# Patient Record
Sex: FEMALE | Race: WHITE | Employment: OTHER | URBAN - METROPOLITAN AREA
[De-identification: names, ages, dates, MRNs, and addresses within clinical notes are randomized per-mention and may not be internally consistent; named-entity substitution may affect disease eponyms.]

---

## 2018-01-15 NOTE — PROGRESS NOTES
Ambulatory/Rehab Services H2 Model Falls Risk Assessment    Risk Factor Pts. ·   Confusion/Disorientation/Impulsivity  []    4 ·   Symptomatic Depression  []   2 ·   Altered Elimination  []   1 ·   Dizziness/Vertigo  []   1 ·   Gender (Male)  []   1 ·   Any administered antiepileptics (anticonvulsants):  []   2 ·   Any administered benzodiazepines:  []   1 ·   Visual Impairment (specify):  []   1 ·   Portable Oxygen Use  []   1 ·   Orthostatic ? BP  []   1 ·   History of Recent Falls (within 3 mos.)  []   5     Ability to Rise from Chair (choose one) Pts. ·   Ability to rise in a single movement  []   0 ·   Pushes up, successful in one attempt  []   1 ·   Multiple attempts, but successful  []   3 ·   Unable to rise without assistance  [x]   4   Total: (5 or greater = High Risk) 4     Falls Prevention Plan:   []                Physical Limitations to Exercise (specify):   []                Mobility Assistance Device (type):   []                Exercise/Equipment Adaptation (specify):    ©2010 [facility] [address] [place] [address] Patent #[id number].  Federal Law prohibits the replication, distribution or use without written permission from [facility] [employer]

## 2018-01-15 NOTE — THERAPY EVALUATION
Marlon Do  : 1935 [id number] [facility],[address] [place] [address].  [phone]   [phone]        OUTPATIENT PHYSICAL THERAPY:Initial Assessment 2018        ICD-10: Treatment Diagnosis: Muscle weakness (generalized) (M62.81)Other abnormalities of gait and mobility (R26.89)  Precautions/Allergies:   Review of patient's allergies indicates no known allergies. Fall Risk Score: 4 (? 5 = High Risk)  MD Orders: Evaluation and treatment  MEDICAL/REFERRING DIAGNOSIS:  Weakness [R53.1]  Ataxic gait [R26.0]   DATE OF ONSET:   REFERRING PHYSICIAN: Stanford Rodríguez MD  RETURN PHYSICIAN APPOINTMENT: TBD     INITIAL ASSESSMENT:  Ms. Gino Dodge presents with decreased strength, decreased functional mobility, poor balance, and difficulty with gait. Her symptoms are consistent with chronic debility. She reports being w/c bound for 6 years and using a w/c for her primary mode of ambulation. She requires paid home health aide to transport her to PT because she is unable to drive and it takes her a taxing effort to leave the home. She has a poor prognosis with PT services. Recommend PT services to improve function and decrease pain. PROBLEM LIST (Impacting functional limitations):  1. Decreased Strength  2. Decreased ADL/Functional Activities  3. Decreased Transfer Abilities  4. Decreased Ambulation Ability/Technique  5. Decreased Balance  6. Decreased Flexibility/Joint Mobility INTERVENTIONS PLANNED:  1. Balance Exercise  2. Cold  3. Continuous Passive Motion (CPM)  4. Electrical Stimulation  5. Gait Training  6. Heat  7. Home Exercise Program (HEP)  8. Manual Therapy  9. Range of Motion (ROM)  10. Therapeutic Activites  11. Therapeutic Exercise/Strengthening  12. Transfer Training   TREATMENT PLAN:  Effective Dates: 18 TO 4/10/18.   Frequency/Duration: 2-3 times a week for 12 weeks  GOALS: (Goals have been discussed and agreed upon with patient.)  Short-Term Functional Goals: Time Frame: 2 weeks  1. Patient will be independent with HEP without pain. 2. Patient will have improved standing tolerance to > 10 minutes without UE support allowing her to cook a light meal at home  Discharge Goals: Time Frame: 12 weeks  1. Patient will have improved TUG to < 60 seconds allowing her to have house hold ambulation. 2. Patient will have improved gait to SBA x 50 ft with FWW allowing her to walk in her home. 3. Patient will have improved Five time sit to stand to < 60 seconds without UE support improving her functional strength. Rehabilitation Potential For Stated Goals: Poor  Regarding Jodeju[facility] Milly therapy, I certify that the treatment plan above will be carried out by a therapist or under their direction. Thank you for this referral,  Para Christopher, PT       Referring Physician Signature: Darrel Sanches MD              Date                      HISTORY:   History of Present Injury/Illness (Reason for Referral):  81 y/o F with c/o of left leg weakness since she had colon cancer and the treatment caused her left leg weakness (since 2011). Her weakness is becoming worse over the years. She reports that she only can stand for 1-2 minutes. She denies pain. Her goals with PT are to be able to strengthen her left leg so she can stand longer. She reports that she has not walked household distance in over 6 years since her cancer treatment. She is currently able to take 4 steps with a walker and that is her baseline function for 6 years. Past Medical History/Comorbidities:   Ms. Alvaro Stephen  has a past medical history of Arthritis; Chronic Sinusitis; Enzyme Deficiency; Hypertension; Leg Cramps; and Pseudocholinesterase Deficiency. She also has no past medical history of Arrhythmia; Asthma; Autoimmune Disease ([address] CAD (Coronary Artery Disease); Cancer ([address]  Chronic Kidney Disease; Chronic Pain; Coagulation Defects; COPD; Diabetes ([address] GERD (Gastroesophageal Reflux Disease); Heart Failure ([facility] Liver Disease; PUD (Peptic Ulcer Disease); Seizures ([facility] Stroke [facility]); Thromboembolus ([facility] Thyroid Disease; or Unspecified Adverse Effect of Anesthesia. Ms. Jennifer Zapien  has a past surgical history that includes hx back surgery ([id number]). Social History/Living Environment:    Lives alone with care givers, w/c bound for 6 years for community and house hold mobility. Prior Level of Function/Work/Activity:  It has been 6 years since she was ambulatory  Previous Treatment Approaches:          [facility] PT recently   Current Medications:    Current Outpatient Prescriptions:     OTHER,NON-FORMULARY,, Pt states taking antibiotic for sinus infection, unsure of name. , Disp: , Rfl:     mupirocin calcium (BACTROBAN NASAL) 2 % nasal ointment, by Nasal route two (2) times a day. Pt did 5 day regimen of bactroban at home , Disp: , Rfl:     amlodipine-benazepril (LOTREL) 5-20 mg per capsule, Take 1 Cap by mouth every morning., Disp: , Rfl:     naproxen (NAPROSYN) 500 mg tablet, Take 500 mg by mouth every morning., Disp: , Rfl:     propoxyphene napsylate-acetaminophen (DARVOCET-N 100) 100-650 mg per tablet, Take 1 Tab by mouth every six (6) hours as needed. , Disp: , Rfl:     METHOCARBAMOL 750 mg tablet, Take 750 mg by mouth every morning., Disp: , Rfl:     ASPIRIN 81 mg Tab, Take 81 mg by mouth daily.  Last dose 2/15/10, Disp: , Rfl:    Date Last Reviewed:  1/16/2018   # of Personal Factors/Comorbidities that affect the Plan of Care: 3+: HIGH COMPLEXITY   EXAMINATION:   Observation/Orthostatic Postural Assessment:         W/c bound for 6 years, forward head, slumped posture  Palpation:          Increased tone and decreased joint flexibility B ankles and knees (denies neuro patho  ROM:     WNL B LE      Strength:     mmt hip flexion 3+ B  Knee extension 3 B  Knee flexion 3+ B  DF 3+ B  PF 2 B      Special Tests:          n/a  Neurological Screen:        Sensation: light touch intact        Proprioception intact B LE  Functional Mobility:         Gait/Ambulation:  Unable        W/c mobility mod I level surface with use of LE and UE, miin A for doors  Balance:          Balance unsupported stand x 10 seconds       STS SBA       Gait x 10ft SBA in \" Bars     Body Structures Involved:  1. Muscles Body Functions Affected:  1. Neuromusculoskeletal  2. Movement Related Activities and Participation Affected:  1. General Tasks and Demands  2. Mobility  3. Self Care  4. Domestic Life  5. Community, Social and [place]   # of elements that affect the Plan of Care: 4+: HIGH COMPLEXITY   CLINICAL PRESENTATION:   Presentation: Evolving clinical presentation with unstable and unpredictable characteristics: HIGH COMPLEXITY   CLINICAL DECISION MAKING:   Outcome Measure: Tool Used: FIM  Score:  Initial: 3/7  Most Recent: X/30 (Date: -- )     ? Mobility - Walking and Moving Around:     - CURRENT STATUS: CM - 80%-99% impaired, limited or restricted    - GOAL STATUS: CK - 40%-59% impaired, limited or restricted    - D/C STATUS:  ---------------To be determined---------------  Medical Necessity:   · Patient is expected to demonstrate progress in strength, range of motion and balance to increase independence with gait and functional mobility . Reason for Services/Other Comments:  · Patient has been observed to have decreased strength and ROM before, during or after an intervention. Use of outcome tool(s) and clinical judgement create a POC that gives a: Difficult prediction of patient's progress: HIGH COMPLEXITY   TREATMENT:   (In addition to Assessment/Re-Assessment sessions the following treatments were rendered)  THERAPEUTIC EXERCISE: (see grid for minutes):  Exercises per grid below to improve mobility, strength and balance. Required moderate visual, verbal, manual and tactile cues to promote proper body alignment, promote proper body posture and promote proper body mechanics. Progressed resistance, range and repetitions as indicated. MANUAL THERAPY: (see grid for minutes): Joint mobilization and Soft tissue mobilization was utilized and necessary because of the patient's restricted joint motion, painful spasm and loss of articular motion. MODALITIES: (see grid for minutes): *  Electrical Stimulation Therapy (IFC) was provided with intensity adjusted throughout treatment to patient tolerance. to reduce pain       *  Cold Pack Therapy in order to provide analgesia and reduce inflammation and edema. *  Hot Pack Therapy in order to relieve muscle spasm. Date: 1/16/18       Modalities:                                Therapeutic Exercise:                                                        Proprioceptive Activities:                                Manual Therapy:                        Therapeutic Activities:                                        HEP: not provided 2/2 to fall risk  [employer] Portal  Treatment/Session Assessment:  She was educated to continue with w/c exercise that she performs. She was educated to perform no standing exercise 2/2 to fall risk. · Pain/ Symptoms: Initial:   **0*/10 Post Session:  0/10 ·   Compliance with Program/Exercises: Will assess as treatment progresses. · Recommendations/Intent for next treatment session: \"Next visit will focus on advancements to more challenging activities\".   Total Treatment Duration:  PT Patient Time In/Time Out  Time In: 1150  Time Out: [id number] [address],

## 2018-01-16 ENCOUNTER — HOSPITAL ENCOUNTER (OUTPATIENT)
Dept: PHYSICAL THERAPY | Age: 83
Discharge: HOME | End: 2018-01-16
Payer: MEDICARE

## 2018-01-16 PROCEDURE — G8979 MOBILITY GOAL STATUS: HCPCS

## 2018-01-16 PROCEDURE — 97163 PT EVAL HIGH COMPLEX 45 MIN: CPT

## 2018-01-16 PROCEDURE — G8978 MOBILITY CURRENT STATUS: HCPCS

## 2018-01-23 ENCOUNTER — HOSPITAL ENCOUNTER (OUTPATIENT)
Dept: PHYSICAL THERAPY | Age: 83
Discharge: HOME | End: 2018-01-23
Payer: MEDICARE

## 2018-01-23 PROCEDURE — 97110 THERAPEUTIC EXERCISES: CPT

## 2018-01-23 NOTE — PROGRESS NOTES
Valentino Phi  : [id number] [facility],[address]  [address] [place] [address]  [phone]   [phone]        OUTPATIENT PHYSICAL THERAPY:Daily Note 2018        ICD-10: Treatment Diagnosis: Muscle weakness (generalized) (M62.81)Other abnormalities of gait and mobility (R26.89)  Precautions/Allergies:   Review of patient's allergies indicates no known allergies. Fall Risk Score: 4 (? 5 = High Risk)  MD Orders: Evaluation and treatment  MEDICAL/REFERRING DIAGNOSIS:  Weakness [R53.1]  Ataxic gait [R26.0]   DATE OF ONSET:   REFERRING PHYSICIAN: Bonita Louie MD  RETURN PHYSICIAN APPOINTMENT: TBD     INITIAL ASSESSMENT:  Ms. Eben Gonzalez presents with decreased strength, decreased functional mobility, poor balance, and difficulty with gait. Her symptoms are consistent with chronic debility. She reports being w/c bound for 6 years and using a w/c for her primary mode of ambulation. She requires paid home health aide to transport her to PT because she is unable to drive and it takes her a taxing effort to leave the home. She has a poor prognosis with PT services. Recommend PT services to improve function and decrease pain. PROBLEM LIST (Impacting functional limitations):  1. Decreased Strength  2. Decreased ADL/Functional Activities  3. Decreased Transfer Abilities  4. Decreased Ambulation Ability/Technique  5. Decreased Balance  6. Decreased Flexibility/Joint Mobility INTERVENTIONS PLANNED:  1. Balance Exercise  2. Cold  3. Continuous Passive Motion (CPM)  4. Electrical Stimulation  5. Gait Training  6. Heat  7. Home Exercise Program (HEP)  8. Manual Therapy  9. Range of Motion (ROM)  10. Therapeutic Activites  11. Therapeutic Exercise/Strengthening  12. Transfer Training   TREATMENT PLAN:  Effective Dates: 18 TO 4/10/18.   Frequency/Duration: 2-3 times a week for 12 weeks  GOALS: (Goals have been discussed and agreed upon with patient.)  Short-Term Functional Goals: Time Frame: 2 weeks  1. Patient will be independent with HEP without pain. 2. Patient will have improved standing tolerance to > 10 minutes without UE support allowing her to cook a light meal at home  Discharge Goals: Time Frame: 12 weeks  1. Patient will have improved TUG to < 60 seconds allowing her to have house hold ambulation. 2. Patient will have improved gait to SBA x 50 ft with FWW allowing her to walk in her home. 3. Patient will have improved Five time sit to stand to < 60 seconds without UE support improving her functional strength. Rehabilitation Potential For Stated Goals: Poor                HISTORY:   History of Present Injury/Illness (Reason for Referral):  79 y/o F with c/o of left leg weakness since she had colon cancer and the treatment caused her left leg weakness (since 2011). Her weakness is becoming worse over the years. She reports that she only can stand for 1-2 minutes. She denies pain. Her goals with PT are to be able to strengthen her left leg so she can stand longer. She reports that she has not walked household distance in over 6 years since her cancer treatment. She is currently able to take 4 steps with a walker and that is her baseline function for 6 years. Past Medical History/Comorbidities:   Ms. Aleksandar Kaur  has a past medical history of Arthritis; Chronic Sinusitis; Enzyme Deficiency; Hypertension; Leg Cramps; and Pseudocholinesterase Deficiency. She also has no past medical history of Arrhythmia; Asthma; Autoimmune Disease ([address] CAD (Coronary Artery Disease); Cancer ([address] Chronic Kidney Disease; Chronic Pain; Coagulation Defects; COPD; Diabetes ([address] GERD (Gastroesophageal Reflux Disease); Heart Failure ([address] Liver Disease; PUD (Peptic Ulcer Disease); Seizures ([address] Stroke [facility]); Thromboembolus ([address] Thyroid Disease; or Unspecified Adverse Effect of Anesthesia. Ms. Aleksandar Kaur  has a past surgical history that includes hx back surgery ([id number]). Social History/Living Environment:    Lives alone with care givers, w/c bound for 6 years for community and house hold mobility. Prior Level of Function/Work/Activity:  It has been 6 years since she was ambulatory  Previous Treatment Approaches:          [facility] PT recently   Current Medications:    Current Outpatient Prescriptions:     OTHER,NON-FORMULARY,, Pt states taking antibiotic for sinus infection, unsure of name. , Disp: , Rfl:     mupirocin calcium (BACTROBAN NASAL) 2 % nasal ointment, by Nasal route two (2) times a day. Pt did 5 day regimen of bactroban at home , Disp: , Rfl:     amlodipine-benazepril (LOTREL) 5-20 mg per capsule, Take 1 Cap by mouth every morning., Disp: , Rfl:     naproxen (NAPROSYN) 500 mg tablet, Take 500 mg by mouth every morning., Disp: , Rfl:     propoxyphene napsylate-acetaminophen (DARVOCET-N 100) 100-650 mg per tablet, Take 1 Tab by mouth every six (6) hours as needed. , Disp: , Rfl:     METHOCARBAMOL 750 mg tablet, Take 750 mg by mouth every morning., Disp: , Rfl:     ASPIRIN 81 mg Tab, Take 81 mg by mouth daily. Last dose 2/15/10, Disp: , Rfl:    Date Last Reviewed:  1/23/2018   EXAMINATION:   Observation/Orthostatic Postural Assessment:         W/c bound for 6 years, forward head, slumped posture  Palpation:          Increased tone and decreased joint flexibility B ankles and knees (denies neuro patho  ROM:     WNL B LE      Strength:     mmt hip flexion 3+ B  Knee extension 3 B  Knee flexion 3+ B  DF 3+ B  PF 2 B      Special Tests:          n/a  Neurological Screen:        Sensation: light touch intact        Proprioception intact B LE  Functional Mobility:         Gait/Ambulation:  Unable        W/c mobility mod I level surface with use of LE and UE, miin A for doors  Balance:          Balance unsupported stand x 10 seconds       STS SBA       Gait x 10ft SBA in \" Bars     Body Structures Involved:  1.  Muscles Body Functions Affected:  1. Neuromusculoskeletal  2. Movement Related Activities and Participation Affected:  1. General Tasks and Demands  2. Mobility  3. Self Care  4. Domestic Life  5. Community, Social and Civic Life   CLINICAL PRESENTATION:   CLINICAL DECISION MAKING:   Outcome Measure: Tool Used: FIM  Score:  Initial: 3/7  Most Recent: X/30 (Date: -- )     ? Mobility - Walking and Moving Around:     - CURRENT STATUS: CM - 80%-99% impaired, limited or restricted    - GOAL STATUS: CK - 40%-59% impaired, limited or restricted    - D/C STATUS:  ---------------To be determined---------------  Medical Necessity:   · Patient is expected to demonstrate progress in strength, range of motion and balance to increase independence with gait and functional mobility . Reason for Services/Other Comments:  · Patient has been observed to have decreased strength and ROM before, during or after an intervention. TREATMENT:   (In addition to Assessment/Re-Assessment sessions the following treatments were rendered)  THERAPEUTIC EXERCISE: (see grid for minutes):  Exercises per grid below to improve mobility, strength and balance. Required moderate visual, verbal, manual and tactile cues to promote proper body alignment, promote proper body posture and promote proper body mechanics. Progressed resistance, range and repetitions as indicated. MANUAL THERAPY: (see grid for minutes): Joint mobilization and Soft tissue mobilization was utilized and necessary because of the patient's restricted joint motion, painful spasm and loss of articular motion. MODALITIES: (see grid for minutes): *  Electrical Stimulation Therapy (IFC) was provided with intensity adjusted throughout treatment to patient tolerance. to reduce pain       *  Cold Pack Therapy in order to provide analgesia and reduce inflammation and edema. *  Hot Pack Therapy in order to relieve muscle spasm.      Date: 1/23/18 (visit 2)       Modalities: Therapeutic Exercise: 45 min       bridges x10       SLR x10 ea LE       SAQ x20 ea LE       Lower trunk rotation x30 B                       Proprioceptive Activities:                                Manual Therapy:                        Therapeutic Activities:        Standing balance in // bars 2 min with CGA       Sit to stand (from raised mat) x4 with min assist       Ambulation in // bars Forward and backwards x3 laps, CGA               HEP: not provided 2/2 to fall risk  [facility] Portal  Treatment/Session Assessment:  Pt reported fatigue at the end of treatment but only requires min assist to transfer from sitting to standing. Pt has difficulty fully extending the knees during standing. Continue POC. Pt was issued an updated HEP with supine exercises. Begin gait training with rolling walker and a wheelchair follow next visit. · Pain/ Symptoms: Initial: 0/10    Pt states that she stands at home to reach the over head microwave. She also states that she uses the a rolling walker occasionally. Pt has a colostomy bag, use caution with gait belt. Post Session:  0/10 ·   Compliance with Program/Exercises: Will assess as treatment progresses. · Recommendations/Intent for next treatment session: \"Next visit will focus on advancements to more challenging activities\".   Total Treatment Duration:  PT Patient Time In/Time Out  Time In: 1150  Time Out: [id number] [address] Lara, PTA

## 2018-01-25 ENCOUNTER — HOSPITAL ENCOUNTER (OUTPATIENT)
Dept: PHYSICAL THERAPY | Age: 83
Discharge: HOME | End: 2018-01-25
Payer: MEDICARE

## 2018-01-25 PROCEDURE — 97110 THERAPEUTIC EXERCISES: CPT

## 2018-01-25 NOTE — PROGRESS NOTES
Zakiya Krause  : [id number] [facility],[address]  [address] [place] [address].  [phone]   [phone]        OUTPATIENT PHYSICAL THERAPY:Daily Note 2018        ICD-10: Treatment Diagnosis: Muscle weakness (generalized) (M62.81)Other abnormalities of gait and mobility (R26.89)  Precautions/Allergies:   Review of patient's allergies indicates no known allergies. Fall Risk Score: 4 (? 5 = High Risk)  MD Orders: Evaluation and treatment  MEDICAL/REFERRING DIAGNOSIS:  Weakness [R53.1]  Ataxic gait [R26.0]   DATE OF ONSET:   REFERRING PHYSICIAN: Tesfaye Vickers MD  RETURN PHYSICIAN APPOINTMENT: TBD     INITIAL ASSESSMENT:  Ms. Kirsten Red presents with decreased strength, decreased functional mobility, poor balance, and difficulty with gait. Her symptoms are consistent with chronic debility. She reports being w/c bound for 6 years and using a w/c for her primary mode of ambulation. She requires paid home health aide to transport her to PT because she is unable to drive and it takes her a taxing effort to leave the home. She has a poor prognosis with PT services. Recommend PT services to improve function and decrease pain. PROBLEM LIST (Impacting functional limitations):  1. Decreased Strength  2. Decreased ADL/Functional Activities  3. Decreased Transfer Abilities  4. Decreased Ambulation Ability/Technique  5. Decreased Balance  6. Decreased Flexibility/Joint Mobility INTERVENTIONS PLANNED:  1. Balance Exercise  2. Cold  3. Continuous Passive Motion (CPM)  4. Electrical Stimulation  5. Gait Training  6. Heat  7. Home Exercise Program (HEP)  8. Manual Therapy  9. Range of Motion (ROM)  10. Therapeutic Activites  11. Therapeutic Exercise/Strengthening  12. Transfer Training   TREATMENT PLAN:  Effective Dates: 18 TO 4/10/18.   Frequency/Duration: 2-3 times a week for 12 weeks  GOALS: (Goals have been discussed and agreed upon with patient.)  Short-Term Functional Goals: Time Frame: 2 weeks  1. Patient will be independent with HEP without pain. 2. Patient will have improved standing tolerance to > 10 minutes without UE support allowing her to cook a light meal at home  Discharge Goals: Time Frame: 12 weeks  1. Patient will have improved TUG to < 60 seconds allowing her to have house hold ambulation. 2. Patient will have improved gait to SBA x 50 ft with FWW allowing her to walk in her home. 3. Patient will have improved Five time sit to stand to < 60 seconds without UE support improving her functional strength. Rehabilitation Potential For Stated Goals: Poor                HISTORY:   History of Present Injury/Illness (Reason for Referral):  81 y/o F with c/o of left leg weakness since she had colon cancer and the treatment caused her left leg weakness (since 2011). Her weakness is becoming worse over the years. She reports that she only can stand for 1-2 minutes. She denies pain. Her goals with PT are to be able to strengthen her left leg so she can stand longer. She reports that she has not walked household distance in over 6 years since her cancer treatment. She is currently able to take 4 steps with a walker and that is her baseline function for 6 years. Past Medical History/Comorbidities:   Ms. Mehul Alvarez  has a past medical history of Arthritis; Chronic Sinusitis; Enzyme Deficiency; Hypertension; Leg Cramps; and Pseudocholinesterase Deficiency. She also has no past medical history of Arrhythmia; Asthma; Autoimmune Disease ([address] CAD (Coronary Artery Disease); Cancer ([address] Chronic Kidney Disease; Chronic Pain; Coagulation Defects; COPD; Diabetes ([address] GERD (Gastroesophageal Reflux Disease); Heart Failure ([address] Liver Disease; PUD (Peptic Ulcer Disease); Seizures ([address] Stroke [facility]); Thromboembolus ([address] Thyroid Disease; or Unspecified Adverse Effect of Anesthesia. Ms. Mehul Alvarez  has a past surgical history that includes hx back surgery ([id number]). Social History/Living Environment:    Lives alone with care givers, w/c bound for 6 years for community and house hold mobility. Prior Level of Function/Work/Activity:  It has been 6 years since she was ambulatory  Previous Treatment Approaches:          [facility] PT recently   Current Medications:    Current Outpatient Prescriptions:     OTHER,NON-FORMULARY,, Pt states taking antibiotic for sinus infection, unsure of name. , Disp: , Rfl:     mupirocin calcium (BACTROBAN NASAL) 2 % nasal ointment, by Nasal route two (2) times a day. Pt did 5 day regimen of bactroban at home , Disp: , Rfl:     amlodipine-benazepril (LOTREL) 5-20 mg per capsule, Take 1 Cap by mouth every morning., Disp: , Rfl:     naproxen (NAPROSYN) 500 mg tablet, Take 500 mg by mouth every morning., Disp: , Rfl:     propoxyphene napsylate-acetaminophen (DARVOCET-N 100) 100-650 mg per tablet, Take 1 Tab by mouth every six (6) hours as needed. , Disp: , Rfl:     METHOCARBAMOL 750 mg tablet, Take 750 mg by mouth every morning., Disp: , Rfl:     ASPIRIN 81 mg Tab, Take 81 mg by mouth daily. Last dose 2/15/10, Disp: , Rfl:    Date Last Reviewed:  1/25/2018   EXAMINATION:   Observation/Orthostatic Postural Assessment:         W/c bound for 6 years, forward head, slumped posture  Palpation:          Increased tone and decreased joint flexibility B ankles and knees (denies neuro patho  ROM:     WNL B LE      Strength:     mmt hip flexion 3+ B  Knee extension 3 B  Knee flexion 3+ B  DF 3+ B  PF 2 B      Special Tests:          n/a  Neurological Screen:        Sensation: light touch intact        Proprioception intact B LE  Functional Mobility:         Gait/Ambulation:  Unable        W/c mobility mod I level surface with use of LE and UE, miin A for doors  Balance:          Balance unsupported stand x 10 seconds       STS SBA       Gait x 10ft SBA in \" Bars     Body Structures Involved:  1.  Muscles Body Functions Affected:  1. Neuromusculoskeletal  2. Movement Related Activities and Participation Affected:  1. General Tasks and Demands  2. Mobility  3. Self Care  4. Domestic Life  5. Community, Social and Civic Life   CLINICAL PRESENTATION:   CLINICAL DECISION MAKING:   Outcome Measure: Tool Used: FIM  Score:  Initial: 3/7  Most Recent: X/30 (Date: -- )     ? Mobility - Walking and Moving Around:     - CURRENT STATUS: CM - 80%-99% impaired, limited or restricted    - GOAL STATUS: CK - 40%-59% impaired, limited or restricted    - D/C STATUS:  ---------------To be determined---------------  Medical Necessity:   · Patient is expected to demonstrate progress in strength, range of motion and balance to increase independence with gait and functional mobility . Reason for Services/Other Comments:  · Patient has been observed to have decreased strength and ROM before, during or after an intervention. TREATMENT:   (In addition to Assessment/Re-Assessment sessions the following treatments were rendered)  THERAPEUTIC EXERCISE: (see grid for minutes):  Exercises per grid below to improve mobility, strength and balance. Required moderate visual, verbal, manual and tactile cues to promote proper body alignment, promote proper body posture and promote proper body mechanics. Progressed resistance, range and repetitions as indicated. MANUAL THERAPY: (see grid for minutes): Joint mobilization and Soft tissue mobilization was utilized and necessary because of the patient's restricted joint motion, painful spasm and loss of articular motion. MODALITIES: (see grid for minutes): *  Electrical Stimulation Therapy (IFC) was provided with intensity adjusted throughout treatment to patient tolerance. to reduce pain       *  Cold Pack Therapy in order to provide analgesia and reduce inflammation and edema. *  Hot Pack Therapy in order to relieve muscle spasm.      Date: 1/23/18 (visit 2) 1/25/18 (visit 3) Modalities:                                Therapeutic Exercise: 45 min 45 min      bridges x10       SLR x10 ea LE       SAQ x20 ea LE       Lower trunk rotation x30 B       Nustep  L3 6 min              Proprioceptive Activities:        Standing balance while using UE  [place]  and putty twist at raised mat table with CGA                      Manual Therapy:                        Therapeutic Activities:        Standing balance in // bars 2 min with CGA       Sit to stand (from raised mat) x4 with min assist       Ambulation in // bars Forward and backwards x3 laps, CGA       Gait training with rolling walker  300 feet with 2 rest breaks, CGA with w/c follow      HEP: not provided 2/2 to fall risk  [employer] Portal  Treatment/Session Assessment:  Pt reported fatigue during the Nustep and took 2 rest breaks. Pt requires verbal cue to depress B scapulae and extend thoracic spine during ambulation. Continue POC. · Pain/ Symptoms: Initial: 0/10    Pt states \"I was a little tired but no pain. \"    Pt has a colostomy bag, use caution with gait belt. Post Session:  0/10 ·   Compliance with Program/Exercises: Will assess as treatment progresses. · Recommendations/Intent for next treatment session: \"Next visit will focus on advancements to more challenging activities\".   Total Treatment Duration:  PT Patient Time In/Time Out  Time In: 1145  Time Out: [id number] [address], PTA

## 2018-01-30 ENCOUNTER — APPOINTMENT (OUTPATIENT)
Dept: PHYSICAL THERAPY | Age: 83
End: 2018-01-30
Payer: MEDICARE

## 2018-02-06 ENCOUNTER — HOSPITAL ENCOUNTER (OUTPATIENT)
Dept: PHYSICAL THERAPY | Age: 83
Discharge: HOME | End: 2018-02-06
Payer: MEDICARE

## 2018-02-08 ENCOUNTER — HOSPITAL ENCOUNTER (OUTPATIENT)
Dept: PHYSICAL THERAPY | Age: 83
Discharge: HOME | End: 2018-02-08
Payer: MEDICARE

## 2018-02-12 NOTE — PROGRESS NOTES
Kasandra Manzanola  : 1935 [address]  [address] [address].  [phone]   [phone]        OUTPATIENT PHYSICAL THERAPY:Daily Note 2018        ICD-10: Treatment Diagnosis: Muscle weakness (generalized) (M62.81)Other abnormalities of gait and mobility (R26.89)  Precautions/Allergies:   Review of patient's allergies indicates no known allergies. Fall Risk Score: 4 (? 5 = High Risk)  MD Orders: Evaluation and treatment  MEDICAL/REFERRING DIAGNOSIS:  Weakness [R53.1]  Ataxic gait [R26.0]   DATE OF ONSET:   REFERRING PHYSICIAN: Usha Veliz MD  RETURN PHYSICIAN APPOINTMENT: TBD     INITIAL ASSESSMENT:  Ms. Dave Trivedi presents with decreased strength, decreased functional mobility, poor balance, and difficulty with gait. Her symptoms are consistent with chronic debility. She reports being w/c bound for 6 years and using a w/c for her primary mode of ambulation. She requires paid home health aide to transport her to PT because she is unable to drive and it takes her a taxing effort to leave the home. She has a poor prognosis with PT services. Recommend PT services to improve function and decrease pain. PROBLEM LIST (Impacting functional limitations):  1. Decreased Strength  2. Decreased ADL/Functional Activities  3. Decreased Transfer Abilities  4. Decreased Ambulation Ability/Technique  5. Decreased Balance  6. Decreased Flexibility/Joint Mobility INTERVENTIONS PLANNED:  1. Balance Exercise  2. Cold  3. Continuous Passive Motion (CPM)  4. Electrical Stimulation  5. Gait Training  6. Heat  7. Home Exercise Program (HEP)  8. Manual Therapy  9. Range of Motion (ROM)  10. Therapeutic Activites  11. Therapeutic Exercise/Strengthening  12. Transfer Training   TREATMENT PLAN:  Effective Dates: 18 TO 4/10/18.   Frequency/Duration: 2-3 times a week for 12 weeks  GOALS: (Goals have been discussed and agreed upon with patient.)  Short-Term Functional Goals: Time Frame: 2 weeks  1. Patient will be independent with HEP without pain. 2. Patient will have improved standing tolerance to > 10 minutes without UE support allowing her to cook a light meal at home  Discharge Goals: Time Frame: 12 weeks  1. Patient will have improved TUG to < 60 seconds allowing her to have house hold ambulation. 2. Patient will have improved gait to SBA x 50 ft with FWW allowing her to walk in her home. 3. Patient will have improved Five time sit to stand to < 60 seconds without UE support improving her functional strength. Rehabilitation Potential For Stated Goals: Poor                HISTORY:   History of Present Injury/Illness (Reason for Referral):  81 y/o F with c/o of left leg weakness since she had colon cancer and the treatment caused her left leg weakness (since 2011). Her weakness is becoming worse over the years. She reports that she only can stand for 1-2 minutes. She denies pain. Her goals with PT are to be able to strengthen her left leg so she can stand longer. She reports that she has not walked household distance in over 6 years since her cancer treatment. She is currently able to take 4 steps with a walker and that is her baseline function for 6 years. Past Medical History/Comorbidities:   Ms. Mali Allen  has a past medical history of Arthritis; Chronic Sinusitis; Enzyme Deficiency; Hypertension; Leg Cramps; and Pseudocholinesterase Deficiency. She also has no past medical history of Arrhythmia; Asthma; Autoimmune Disease ([address] CAD (Coronary Artery Disease); Cancer ([address] Chronic Kidney Disease; Chronic Pain; Coagulation Defects; COPD; Diabetes ([address] GERD (Gastroesophageal Reflux Disease); Heart Failure ([address] Liver Disease; PUD (Peptic Ulcer Disease); Seizures ([address] Stroke [facility]); Thromboembolus ([address] Thyroid Disease; or Unspecified Adverse Effect of Anesthesia. Ms. Mali Allen  has a past surgical history that includes hx back surgery ([id number]). Social History/Living Environment:    Lives alone with care givers, w/c bound for 6 years for community and house hold mobility. Prior Level of Function/Work/Activity:  It has been 6 years since she was ambulatory  Previous Treatment Approaches:          [facility] PT recently   Current Medications:    Current Outpatient Prescriptions:     OTHER,NON-FORMULARY,, Pt states taking antibiotic for sinus infection, unsure of name. , Disp: , Rfl:     mupirocin calcium (BACTROBAN NASAL) 2 % nasal ointment, by Nasal route two (2) times a day. Pt did 5 day regimen of bactroban at home , Disp: , Rfl:     amlodipine-benazepril (LOTREL) 5-20 mg per capsule, Take 1 Cap by mouth every morning., Disp: , Rfl:     naproxen (NAPROSYN) 500 mg tablet, Take 500 mg by mouth every morning., Disp: , Rfl:     propoxyphene napsylate-acetaminophen (DARVOCET-N 100) 100-650 mg per tablet, Take 1 Tab by mouth every six (6) hours as needed. , Disp: , Rfl:     METHOCARBAMOL 750 mg tablet, Take 750 mg by mouth every morning., Disp: , Rfl:     ASPIRIN 81 mg Tab, Take 81 mg by mouth daily. Last dose 2/15/10, Disp: , Rfl:    Date Last Reviewed:  2/13/2018   EXAMINATION:   Observation/Orthostatic Postural Assessment:         W/c bound for 6 years, forward head, slumped posture  Palpation:          Increased tone and decreased joint flexibility B ankles and knees (denies neuro patho  ROM:     WNL B LE      Strength:     mmt hip flexion 3+ B  Knee extension 3 B  Knee flexion 3+ B  DF 3+ B  PF 2 B      Special Tests:          n/a  Neurological Screen:        Sensation: light touch intact        Proprioception intact B LE  Functional Mobility:         Gait/Ambulation:  Unable        W/c mobility mod I level surface with use of LE and UE, miin A for doors  Balance:          Balance unsupported stand x 10 seconds       STS SBA       Gait x 10ft SBA in \" Bars     Body Structures Involved:  1.  Muscles Body Functions Affected:  1. Neuromusculoskeletal  2. Movement Related Activities and Participation Affected:  1. General Tasks and Demands  2. Mobility  3. Self Care  4. Domestic Life  5. Community, Social and Civic Life   CLINICAL PRESENTATION:   CLINICAL DECISION MAKING:   Outcome Measure: Tool Used: FIM  Score:  Initial: 3/7  Most Recent: X/30 (Date: -- )     ? Mobility - Walking and Moving Around:     - CURRENT STATUS: CM - 80%-99% impaired, limited or restricted    - GOAL STATUS: CK - 40%-59% impaired, limited or restricted    - D/C STATUS:  ---------------To be determined---------------  Medical Necessity:   · Patient is expected to demonstrate progress in strength, range of motion and balance to increase independence with gait and functional mobility . Reason for Services/Other Comments:  · Patient has been observed to have decreased strength and ROM before, during or after an intervention. TREATMENT:   (In addition to Assessment/Re-Assessment sessions the following treatments were rendered)  THERAPEUTIC EXERCISE: (see grid for minutes):  Exercises per grid below to improve mobility, strength and balance. Required moderate visual, verbal, manual and tactile cues to promote proper body alignment, promote proper body posture and promote proper body mechanics. Progressed resistance, range and repetitions as indicated. MANUAL THERAPY: (see grid for minutes): Joint mobilization and Soft tissue mobilization was utilized and necessary because of the patient's restricted joint motion, painful spasm and loss of articular motion. MODALITIES: (see grid for minutes): *  Electrical Stimulation Therapy (IFC) was provided with intensity adjusted throughout treatment to patient tolerance. to reduce pain       *  Cold Pack Therapy in order to provide analgesia and reduce inflammation and edema. *  Hot Pack Therapy in order to relieve muscle spasm.      Date: 1/23/18 (visit 2) 1/25/18 (visit 3) 2/13/18  (visit 4)     Modalities:                                Therapeutic Exercise: 45 min 45 min 45 mins     bridges x10       SLR x10 ea LE       SAQ x20 ea LE       Lower trunk rotation x30 B       Nustep  L3 6 min              Proprioceptive Activities:        Standing balance while using UE  [place]  and putty twist at raised mat table with CGA                      Manual Therapy:                        Therapeutic Activities:        Standing balance in // bars 2 min with CGA  x5 min with rest breaks and SBA     Sit to stand (from raised mat) x4 with min assist  2x5 with SBA from WC to stand with FWW     Ambulation in // bars Forward and backwards x3 laps, CGA  Gait FWW around cones 1 lap CGA, cones over with SBA in \" bars forward and lateral 2 laps each with breaks     Gait training with rolling walker  300 feet with 2 rest breaks, CGA with w/c follow 150 ft x 2 with CGS/SBA and FWW     HEP: not provided 2/2 to fall risk  [employer] Portal  Treatment/Session Assessment:  She became short of breath and required rest breaks between standing exercise. Recommend focus on standing exercise to help her reach her goals. Continue POC. · Pain/ Symptoms: Initial: 0/10    Pt states \"I think the PT makes me leg stronger bc it has not been giving out at home like it normally would\"\"    Pt has a colostomy bag, use caution with gait belt. Post Session:  0/10 No increased pain ·   Compliance with Program/Exercises: Will assess as treatment progresses. · Recommendations/Intent for next treatment session: \"Next visit will focus on advancements to more challenging activities\".   Total Treatment Duration:  PT Patient Time In/Time Out  Time In: 1145  Time Out: [address]

## 2018-02-13 ENCOUNTER — HOSPITAL ENCOUNTER (OUTPATIENT)
Dept: PHYSICAL THERAPY | Age: 83
Discharge: HOME | End: 2018-02-13
Payer: MEDICARE

## 2018-02-13 PROCEDURE — 97110 THERAPEUTIC EXERCISES: CPT

## 2018-02-15 ENCOUNTER — HOSPITAL ENCOUNTER (OUTPATIENT)
Dept: PHYSICAL THERAPY | Age: 83
Discharge: HOME | End: 2018-02-15
Payer: MEDICARE

## 2018-02-15 PROCEDURE — 97110 THERAPEUTIC EXERCISES: CPT

## 2018-02-15 NOTE — PROGRESS NOTES
Zbigniew Prater  : 1935 [address] [facility]  [address] [place] [address]  [phone]   [phone]        OUTPATIENT PHYSICAL THERAPY:Daily Note 2/15/2018        ICD-10: Treatment Diagnosis: Muscle weakness (generalized) (M62.81)Other abnormalities of gait and mobility (R26.89)  Precautions/Allergies:   Review of patient's allergies indicates no known allergies. Fall Risk Score: 4 (? 5 = High Risk)  MD Orders: Evaluation and treatment  MEDICAL/REFERRING DIAGNOSIS:  Weakness [R53.1]  Ataxic gait [R26.0]   DATE OF ONSET:   REFERRING PHYSICIAN: Reanna Sainz MD  RETURN PHYSICIAN APPOINTMENT: TBD     INITIAL ASSESSMENT:  Ms. Carlos Magaña presents with decreased strength, decreased functional mobility, poor balance, and difficulty with gait. Her symptoms are consistent with chronic debility. She reports being w/c bound for 6 years and using a w/c for her primary mode of ambulation. She requires paid home health aide to transport her to PT because she is unable to drive and it takes her a taxing effort to leave the home. She has a poor prognosis with PT services. Recommend PT services to improve function and decrease pain. PROBLEM LIST (Impacting functional limitations):  1. Decreased Strength  2. Decreased ADL/Functional Activities  3. Decreased Transfer Abilities  4. Decreased Ambulation Ability/Technique  5. Decreased Balance  6. Decreased Flexibility/Joint Mobility INTERVENTIONS PLANNED:  1. Balance Exercise  2. Cold  3. Continuous Passive Motion (CPM)  4. Electrical Stimulation  5. Gait Training  6. Heat  7. Home Exercise Program (HEP)  8. Manual Therapy  9. Range of Motion (ROM)  10. Therapeutic Activites  11. Therapeutic Exercise/Strengthening  12. Transfer Training   TREATMENT PLAN:  Effective Dates: 18 TO 4/10/18.   Frequency/Duration: 2-3 times a week for 12 weeks  GOALS: (Goals have been discussed and agreed upon with patient.)  Short-Term Functional Goals: Time Frame: 2 weeks  1. Patient will be independent with HEP without pain. 2. Patient will have improved standing tolerance to > 10 minutes without UE support allowing her to cook a light meal at home  Discharge Goals: Time Frame: 12 weeks  1. Patient will have improved TUG to < 60 seconds allowing her to have house hold ambulation. 2. Patient will have improved gait to SBA x 50 ft with FWW allowing her to walk in her home. 3. Patient will have improved Five time sit to stand to < 60 seconds without UE support improving her functional strength. Rehabilitation Potential For Stated Goals: Poor                HISTORY:   History of Present Injury/Illness (Reason for Referral):  81 y/o F with c/o of left leg weakness since she had colon cancer and the treatment caused her left leg weakness (since 2011). Her weakness is becoming worse over the years. She reports that she only can stand for 1-2 minutes. She denies pain. Her goals with PT are to be able to strengthen her left leg so she can stand longer. She reports that she has not walked household distance in over 6 years since her cancer treatment. She is currently able to take 4 steps with a walker and that is her baseline function for 6 years. Past Medical History/Comorbidities:   Ms. Jose Ramon Arora  has a past medical history of Arthritis; Chronic Sinusitis; Enzyme Deficiency; Hypertension; Leg Cramps; and Pseudocholinesterase Deficiency. She also has no past medical history of Arrhythmia; Asthma; Autoimmune Disease ([address] CAD (Coronary Artery Disease); Cancer ([address] Chronic Kidney Disease; Chronic Pain; Coagulation Defects; COPD; Diabetes ([address] GERD (Gastroesophageal Reflux Disease); Heart Failure ([address] Liver Disease; PUD (Peptic Ulcer Disease); Seizures ([address] Stroke [facility]); Thromboembolus ([address] Thyroid Disease; or Unspecified Adverse Effect of Anesthesia. Ms. Jose Ramon Arora  has a past surgical history that includes hx back surgery ([id number]). Social History/Living Environment:    Lives alone with care givers, w/c bound for 6 years for community and house hold mobility. Prior Level of Function/Work/Activity:  It has been 6 years since she was ambulatory  Previous Treatment Approaches:          [facility] PT recently   Current Medications:    Current Outpatient Prescriptions:     OTHER,NON-FORMULARY,, Pt states taking antibiotic for sinus infection, unsure of name. , Disp: , Rfl:     mupirocin calcium (BACTROBAN NASAL) 2 % nasal ointment, by Nasal route two (2) times a day. Pt did 5 day regimen of bactroban at home , Disp: , Rfl:     amlodipine-benazepril (LOTREL) 5-20 mg per capsule, Take 1 Cap by mouth every morning., Disp: , Rfl:     naproxen (NAPROSYN) 500 mg tablet, Take 500 mg by mouth every morning., Disp: , Rfl:     propoxyphene napsylate-acetaminophen (DARVOCET-N 100) 100-650 mg per tablet, Take 1 Tab by mouth every six (6) hours as needed. , Disp: , Rfl:     METHOCARBAMOL 750 mg tablet, Take 750 mg by mouth every morning., Disp: , Rfl:     ASPIRIN 81 mg Tab, Take 81 mg by mouth daily. Last dose 2/15/10, Disp: , Rfl:    Date Last Reviewed:  2/15/2018   EXAMINATION:   Observation/Orthostatic Postural Assessment:         W/c bound for 6 years, forward head, slumped posture  Palpation:          Increased tone and decreased joint flexibility B ankles and knees (denies neuro patho  ROM:     WNL B LE      Strength:     mmt hip flexion 3+ B  Knee extension 3 B  Knee flexion 3+ B  DF 3+ B  PF 2 B      Special Tests:          n/a  Neurological Screen:        Sensation: light touch intact        Proprioception intact B LE  Functional Mobility:         Gait/Ambulation:  Unable        W/c mobility mod I level surface with use of LE and UE, miin A for doors  Balance:          Balance unsupported stand x 10 seconds       STS SBA       Gait x 10ft SBA in \" Bars     Body Structures Involved:  1.  Muscles Body Functions Affected:  1. Neuromusculoskeletal  2. Movement Related Activities and Participation Affected:  1. General Tasks and Demands  2. Mobility  3. Self Care  4. Domestic Life  5. Community, Social and Civic Life   CLINICAL PRESENTATION:   CLINICAL DECISION MAKING:   Outcome Measure: Tool Used: FIM  Score:  Initial: 3/7  Most Recent: X/30 (Date: -- )     ? Mobility - Walking and Moving Around:     - CURRENT STATUS: CM - 80%-99% impaired, limited or restricted    - GOAL STATUS: CK - 40%-59% impaired, limited or restricted    - D/C STATUS:  ---------------To be determined---------------  Medical Necessity:   · Patient is expected to demonstrate progress in strength, range of motion and balance to increase independence with gait and functional mobility . Reason for Services/Other Comments:  · Patient has been observed to have decreased strength and ROM before, during or after an intervention. TREATMENT:   (In addition to Assessment/Re-Assessment sessions the following treatments were rendered)  THERAPEUTIC EXERCISE: (see grid for minutes):  Exercises per grid below to improve mobility, strength and balance. Required moderate visual, verbal, manual and tactile cues to promote proper body alignment, promote proper body posture and promote proper body mechanics. Progressed resistance, range and repetitions as indicated. MANUAL THERAPY: (see grid for minutes): Joint mobilization and Soft tissue mobilization was utilized and necessary because of the patient's restricted joint motion, painful spasm and loss of articular motion. MODALITIES: (see grid for minutes): *  Electrical Stimulation Therapy (IFC) was provided with intensity adjusted throughout treatment to patient tolerance. to reduce pain       *  Cold Pack Therapy in order to provide analgesia and reduce inflammation and edema. *  Hot Pack Therapy in order to relieve muscle spasm.      Date: 1/23/18 (visit 2) 1/25/18 (visit 3) 2/13/18  (visit 4) 2/15/18  (visit 5)    Modalities:                                Therapeutic Exercise: 45 min 45 min 45 mins 45 min    bridges x10       SLR x10 ea LE       SAQ x20 ea LE       Lower trunk rotation x30 B       Nustep  L3 6 min  repeat            Proprioceptive Activities:        Standing balance while using UE  [place]  and putty twist at raised mat table with CGA                      Manual Therapy:                        Therapeutic Activities:        Standing balance in // bars 2 min with CGA  x5 min with rest breaks and SBA repeat    Sit to stand (from raised mat) x4 with min assist  2x5 with SBA from WC to stand with FWW repeat    Ambulation in // bars Forward and backwards x3 laps, CGA  Gait FWW around cones 1 lap CGA, cones over with SBA in \" bars forward and lateral 2 laps each with breaks repeat    Gait training with rolling walker  300 feet with 2 rest breaks, CGA with w/c follow 150 ft x 2 with CGS/SBA and FWW repeat    HEP: not provided 2/2 to fall risk  [employer]  Treatment/Session Assessment:  She required rest breaks due to SOB but had improved posture and foot clearance. Continue POC. · Pain/ Symptoms: Initial: 0/10    Pt states \"I feel good today\"    Pt has a colostomy bag, use caution with gait belt. Post Session:  0/10 No increased pain ·   Compliance with Program/Exercises: Will assess as treatment progresses. · Recommendations/Intent for next treatment session: \"Next visit will focus on advancements to more challenging activities\".   Total Treatment Duration:  PT Patient Time In/Time Out  Time In: 1145  Time Out: [address]

## 2018-02-20 ENCOUNTER — HOSPITAL ENCOUNTER (OUTPATIENT)
Dept: PHYSICAL THERAPY | Age: 83
Discharge: HOME | End: 2018-02-20
Payer: MEDICARE

## 2018-02-22 ENCOUNTER — HOSPITAL ENCOUNTER (OUTPATIENT)
Dept: PHYSICAL THERAPY | Age: 83
Discharge: HOME | End: 2018-02-22
Payer: MEDICARE

## 2018-02-27 ENCOUNTER — HOSPITAL ENCOUNTER (OUTPATIENT)
Dept: PHYSICAL THERAPY | Age: 83
Discharge: HOME | End: 2018-02-27
Payer: MEDICARE

## 2018-03-15 ENCOUNTER — APPOINTMENT (OUTPATIENT)
Dept: PHYSICAL THERAPY | Age: 83
End: 2018-03-15

## 2018-03-16 NOTE — THERAPY DISCHARGE
Carlos Cleary   [phone] @ [address]  [facility]  [phone]   [phone]           PHYSICIAN COMMUNICATION and discontinuation summary     REFERRING PHYSICIAN: Barrett Lord MD  Return Physician Appointment: TBD  MEDICAL/REFERRING DIAGNOSIS:  · Weakness [R53.1]  · Ataxic gait [R26.0]  ATTENDANCE: Calros Cleary has attended 5 out of 12 visits, with 6 cancellation(s) and 3 no shows. ASSESSMENT:  DATE: 3/16/2018    PROGRESS: Carlos Cleary made minimal progress with PT services. She was not compliant returning to PT services and we are unable to assess her progress. RECOMMENDATIONS: Discharge to independent with HEP. Recommend f/u with her referring physician is she continues to have symptoms.      Thank you for this referral,  Stefan Blevins, PT     Referring Physician Signature: Barrett Lord MD          Date

## 2018-03-20 ENCOUNTER — APPOINTMENT (OUTPATIENT)
Dept: PHYSICAL THERAPY | Age: 83
End: 2018-03-20

## 2018-03-22 ENCOUNTER — APPOINTMENT (OUTPATIENT)
Dept: PHYSICAL THERAPY | Age: 83
End: 2018-03-22

## 2018-03-27 ENCOUNTER — APPOINTMENT (OUTPATIENT)
Dept: PHYSICAL THERAPY | Age: 83
End: 2018-03-27

## 2018-03-29 ENCOUNTER — APPOINTMENT (OUTPATIENT)
Dept: PHYSICAL THERAPY | Age: 83
End: 2018-03-29

## 2018-04-10 ENCOUNTER — HOSPITAL ENCOUNTER (OUTPATIENT)
Dept: PHYSICAL THERAPY | Age: 83
End: 2018-04-10

## 2018-05-01 ENCOUNTER — APPOINTMENT (OUTPATIENT)
Dept: PHYSICAL THERAPY | Age: 83
End: 2018-05-01

## 2018-05-03 ENCOUNTER — APPOINTMENT (OUTPATIENT)
Dept: PHYSICAL THERAPY | Age: 83
End: 2018-05-03

## 2018-05-08 ENCOUNTER — APPOINTMENT (OUTPATIENT)
Dept: PHYSICAL THERAPY | Age: 83
End: 2018-05-08

## 2018-05-10 ENCOUNTER — APPOINTMENT (OUTPATIENT)
Dept: PHYSICAL THERAPY | Age: 83
End: 2018-05-10

## 2018-05-15 ENCOUNTER — APPOINTMENT (OUTPATIENT)
Dept: PHYSICAL THERAPY | Age: 83
End: 2018-05-15

## 2018-05-17 ENCOUNTER — APPOINTMENT (OUTPATIENT)
Dept: PHYSICAL THERAPY | Age: 83
End: 2018-05-17

## 2018-05-22 ENCOUNTER — APPOINTMENT (OUTPATIENT)
Dept: PHYSICAL THERAPY | Age: 83
End: 2018-05-22

## 2018-05-24 ENCOUNTER — APPOINTMENT (OUTPATIENT)
Dept: PHYSICAL THERAPY | Age: 83
End: 2018-05-24